# Patient Record
Sex: FEMALE | Race: WHITE | NOT HISPANIC OR LATINO | URBAN - METROPOLITAN AREA
[De-identification: names, ages, dates, MRNs, and addresses within clinical notes are randomized per-mention and may not be internally consistent; named-entity substitution may affect disease eponyms.]

---

## 2019-09-10 ENCOUNTER — EMERGENCY (EMERGENCY)
Facility: HOSPITAL | Age: 64
LOS: 1 days | Discharge: HOME | End: 2019-09-10
Attending: EMERGENCY MEDICINE | Admitting: EMERGENCY MEDICINE
Payer: MEDICARE

## 2019-09-10 VITALS
HEART RATE: 79 BPM | DIASTOLIC BLOOD PRESSURE: 82 MMHG | OXYGEN SATURATION: 99 % | SYSTOLIC BLOOD PRESSURE: 151 MMHG | TEMPERATURE: 98 F | RESPIRATION RATE: 19 BRPM

## 2019-09-10 VITALS
RESPIRATION RATE: 14 BRPM | HEART RATE: 76 BPM | OXYGEN SATURATION: 100 % | HEIGHT: 65 IN | WEIGHT: 207.9 LBS | SYSTOLIC BLOOD PRESSURE: 166 MMHG | TEMPERATURE: 98 F | DIASTOLIC BLOOD PRESSURE: 75 MMHG

## 2019-09-10 PROCEDURE — 70450 CT HEAD/BRAIN W/O DYE: CPT | Mod: 26

## 2019-09-10 PROCEDURE — 99284 EMERGENCY DEPT VISIT MOD MDM: CPT | Mod: 25

## 2019-09-10 PROCEDURE — 70450 CT HEAD/BRAIN W/O DYE: CPT

## 2019-09-10 PROCEDURE — 99284 EMERGENCY DEPT VISIT MOD MDM: CPT

## 2019-09-10 RX ORDER — NEBIVOLOL HYDROCHLORIDE 5 MG/1
1 TABLET ORAL
Qty: 0 | Refills: 0 | DISCHARGE

## 2019-09-10 RX ORDER — INTERFERON BETA-1A 22 UG/.5ML
0 INJECTION, SOLUTION SUBCUTANEOUS
Qty: 0 | Refills: 0 | DISCHARGE

## 2019-09-10 NOTE — ED ADULT NURSE NOTE - OBJECTIVE STATEMENT
Pt. received alert and oriented x3 with chief complaint of trip and fall walking into doctors office w/ head pain. Pt. presents w/ swelling to right side of forehead. Pt. denies LOC.

## 2019-09-10 NOTE — ED PROVIDER NOTE - OBJECTIVE STATEMENT
64 y female presents with s/p trip and fall, right side head injury,  states she was walking down john in a medical office building, looking for the office of doctor she was going to see,  went from carpeted floor to linoleum floor, her feet became stuck on linoleum floor and she fell forward and struck her head on floor,  has a right forehead closed hematoma, no open wound, denies loc,  no nv, no neck pain,  states she does not take blood thinners.  states she has hx of ms and htn,  walks with a cane.  PMD Dr Tinoco

## 2019-09-10 NOTE — ED PROVIDER NOTE - PROGRESS NOTE DETAILS
ct head no acute findings,  copy of result given, advised follow up with pmd,  otc tylenol as directed for pain, any concerns return to ED

## 2019-09-10 NOTE — ED PROVIDER NOTE - ATTENDING CONTRIBUTION TO CARE
I have personally performed a face to face diagnostic evaluation on this patient.  I have reviewed the PA note and agree with the history, exam, and plan of care, except as noted.  History and Exam by me shows  mechanical fall today c head trauma c no loc.  head ct neg.  pt dc prior to my evaluation

## 2019-09-10 NOTE — ED PROVIDER NOTE - PATIENT PORTAL LINK FT
You can access the FollowMyHealth Patient Portal offered by [facility] by registering at the following website: [email or web address]. By joining [employer]’s FollowMyHealth portal, you will also be able to view your health information using other applications (apps) compatible with our system.

## 2020-09-24 NOTE — ED PROVIDER NOTE - CHPI ED SYMPTOMS NEG
[de-identified] : The patient returns regular breast cancer surveillance visit and notes no new symptoms or changes in either breast. She had a right breast WLE/SLN/RT beginning in January of 2011 she was on an aromatase inhibitor for 5 years and continues to follow regularly with Dr. Ray who she saw last month.
no nausea/no syncope/no loss of consciousness/no change in level of consciousness/no confusion/no vomiting/no weakness/no blurred vision/no dizziness

## 2021-08-02 NOTE — ED PROVIDER NOTE - CPE EDP ENMT NORM
Patient scheduled on Thursday, August 5 th at 1:00 for new chemo teach.      SS familiar with patient and spouse.    SS to assist patient with resources and support as needed.      
normal...